# Patient Record
Sex: FEMALE | Race: WHITE | NOT HISPANIC OR LATINO | Employment: FULL TIME | URBAN - METROPOLITAN AREA
[De-identification: names, ages, dates, MRNs, and addresses within clinical notes are randomized per-mention and may not be internally consistent; named-entity substitution may affect disease eponyms.]

---

## 2022-07-09 ENCOUNTER — APPOINTMENT (OUTPATIENT)
Dept: RADIOLOGY | Facility: CLINIC | Age: 52
End: 2022-07-09
Attending: EMERGENCY MEDICINE
Payer: COMMERCIAL

## 2022-07-09 ENCOUNTER — HOSPITAL ENCOUNTER (EMERGENCY)
Facility: CLINIC | Age: 52
Discharge: HOME | End: 2022-07-09
Attending: EMERGENCY MEDICINE | Admitting: EMERGENCY MEDICINE
Payer: COMMERCIAL

## 2022-07-09 VITALS
DIASTOLIC BLOOD PRESSURE: 86 MMHG | WEIGHT: 190 LBS | BODY MASS INDEX: 28.14 KG/M2 | TEMPERATURE: 98.2 F | HEART RATE: 104 BPM | OXYGEN SATURATION: 95 % | HEIGHT: 69 IN | RESPIRATION RATE: 16 BRPM | SYSTOLIC BLOOD PRESSURE: 136 MMHG

## 2022-07-09 DIAGNOSIS — S62.102A CLOSED FRACTURE OF LEFT WRIST, INITIAL ENCOUNTER: ICD-10-CM

## 2022-07-09 PROCEDURE — 73080 X-RAY EXAM OF ELBOW: CPT | Mod: LT

## 2022-07-09 PROCEDURE — 99284 EMERGENCY DEPT VISIT MOD MDM: CPT | Mod: 25

## 2022-07-09 PROCEDURE — 25600 CLTX DST RDL FX/EPHYS SEP WO: CPT | Mod: LT

## 2022-07-09 PROCEDURE — 73110 X-RAY EXAM OF WRIST: CPT | Mod: LT

## 2022-07-09 RX ORDER — OXYCODONE HYDROCHLORIDE 5 MG/1
5 TABLET ORAL EVERY 6 HOURS PRN
Qty: 10 TABLET | Refills: 0 | Status: SHIPPED | OUTPATIENT
Start: 2022-07-09 | End: 2022-07-12

## 2022-07-09 RX ORDER — OXYCODONE HYDROCHLORIDE 5 MG/1
5 TABLET ORAL ONCE
Status: DISCONTINUED | OUTPATIENT
Start: 2022-07-09 | End: 2022-07-09 | Stop reason: HOSPADM

## 2022-07-09 ASSESSMENT — ENCOUNTER SYMPTOMS
WEAKNESS: 0
NUMBNESS: 0
BRUISES/BLEEDS EASILY: 0
SHORTNESS OF BREATH: 0
VOMITING: 0
CONFUSION: 0
BACK PAIN: 0
ABDOMINAL PAIN: 0
JOINT SWELLING: 1
NECK PAIN: 0
WOUND: 1

## 2022-07-09 NOTE — DISCHARGE INSTRUCTIONS
You should take ibuprofen, 600mg, three times per day as needed for pain.  You can also use acetaminophen, 650 mg, up to four times per day as needed for pain.  You can use these medications together, there are noconcerning interactions.  If this does not adequately control your pain, you can use 1-2 tabs of the prescribed oxycodone every 6 hours as needed for uncontrolled pain.  If you use this medication, you should not drive orperform other activities that require full attention as this medication may make you drowsy.  Oxycodone, like all opiate pain medications, is potentially habit forming and you should only use the minimum amount necessary tocontrol your pain.    Wear your splint 24 hours a day unless you are in the shower.  Follow-up orthopedics next week.  Return to the ER for worsening symptoms.

## 2022-07-09 NOTE — ED PROVIDER NOTES
EMERGENCY DEPARTMENT ENCOUNTER      NAME: Noemy Quezada  AGE: 52 year old female  YOB: 1970  MRN: [id number]  EVALUATION DATE & TIME: 7/9/2022  2:49 AM    PCP: Nola Navas    ED PROVIDER: Bunny Perez MD      Chief Complaint   Patient presents with     Trauma         FINAL IMPRESSION:  1. Closed fracture of left wrist, initial encounter          ED COURSE & MEDICAL DECISION MAKING:    Pertinent Labs & Imaging studies reviewed. (See chart for details)  3:03 AM I met with the patient to gather history and to perform my initial exam. We discussed plans for the ED course, including diagnostic testing and treatment.  4:15 AM I rechecked and updated the patient with results and plan for discharge.    52 year old female presents to the Emergency Department for evaluation of left wrist trauma.  Fell 24 hours ago.  Has swelling and tenderness    X-ray shows mildly displaced distal radius fracture.  Compartment syndrome unlikely.  Neuro intact.  Prefabricated splint applied.  Ordered tablet oxycodone which patient is declining.  X-ray elbow negative for fracture.    Plan for discharge home and follow-up with orthopedics next week    At the conclusion of the encounter I discussed the results of all of the tests and the disposition. The questions were answered. The patient or family acknowledged understanding and was agreeable with the care plan.       PPE worn: n95 mask, face shield.    MEDICATIONS GIVEN IN THE EMERGENCY:  Medications   oxyCODONE (ROXICODONE) tablet 5 mg (5 mg Oral Not Given 7/9/22 0314)       NEW PRESCRIPTIONS STARTED AT TODAY'S ER VISIT  New Prescriptions    OXYCODONE (ROXICODONE) 5 MG TABLET    Take 1 tablet (5 mg) by mouth every 6 hours as needed for pain          =================================================================    HPI    Patient information was obtained from: patient     Use of : N/A       Noemy Quezada is a 52 year old female otherwise healthy  who presents to this ED via walk-in for evaluation of left wrist pain and swelling.     Patient presents with left wrist pain and swelling after a trip and fall onto tile floor at home on 7/7/22 (~2 days ago). Patient landed on her left wrist and elbow during the fall. She comes to the ED tonight as she has had constant severe pain to her left wrist since the fall. Has been unable to sleep well secondary to pain. Also notes left elbow pain. No head injury. No other injuries or pain related complaints from the fall. Denies headache, neck or back pain, hip pain, right upper extremity pain, or pain to her lower extremities. She is right handed. States she is otherwise healthy. Did have a few alcoholic drinks tonight though nothing within the past few hours prior to ED arrival.       REVIEW OF SYSTEMS   Review of Systems   HENT:        Negative for head injury.   Respiratory: Negative for shortness of breath.    Cardiovascular: Negative for chest pain.   Gastrointestinal: Negative for abdominal pain and vomiting.   Musculoskeletal: Positive for joint swelling. Negative for back pain and neck pain.        Positive for left wrist pain and left elbow pain.  Negative for hip pain, right upper extremity pain, or lower extremity pain.   Skin: Positive for wound.   Neurological: Negative for weakness and numbness.   Hematological: Does not bruise/bleed easily.   Psychiatric/Behavioral: Negative for confusion.        PAST MEDICAL HISTORY:  History reviewed. No pertinent past medical history.    PAST SURGICAL HISTORY:  History reviewed. No pertinent surgical history.        CURRENT MEDICATIONS:    oxyCODONE (ROXICODONE) 5 MG tablet        ALLERGIES:  No Known Allergies    FAMILY HISTORY:  History reviewed. No pertinent family history.    SOCIAL HISTORY:   Social History     Socioeconomic History     Marital status:      Spouse name: None     Number of children: None     Years of education: None     Highest education  "level: None   Substance and Sexual Activity     Alcohol use: Yes         VITALS:  /86   Pulse 104   Temp 98.2  F (36.8  C) (Temporal)   Resp 16   Ht 1.753 m (5' 9\")   Wt 86.2 kg (190 lb)   SpO2 95%   BMI 28.06 kg/m      PHYSICAL EXAM    Vitals: /86   Pulse 104   Temp 98.2  F (36.8  C) (Temporal)   Resp 16   Ht 1.753 m (5' 9\")   Wt 86.2 kg (190 lb)   SpO2 95%   BMI 28.06 kg/m    General: Appears in no acute distress, awake, alert, interactive.  Eyes: Conjunctivae non-injected. Sclera anicteric.  HENT: Atraumatic.  Neck: Supple.  Respiratory/Chest: Respiration unlabored.  Abdomen: non distended  Musculoskeletal: tenderness and swelling to left distal radius, tenderness to left elbow. No snuff box tenderness. No edema or erythema.  Skin: Normal color. No rash or diaphoresis.  Neurologic: Face symmetric, moves all extremities spontaneously. Speech clear.  Psychiatric: Oriented to person, place, and time. Affect appropriate.     LAB:  All pertinent labs reviewed and interpreted.  Results for orders placed or performed during the hospital encounter of 07/09/22   XR Wrist Left G/E 3 Views    Impression    IMPRESSION: Minimally displaced fracture of the dorsal cortex of the distal radius, best appreciated on the lateral view. Overlying soft tissue swelling.   Elbow  XR, G/E 3 views, left    Impression    IMPRESSION: Normal joint spaces and alignment. No fracture or joint effusion.       RADIOLOGY:  Reviewed all pertinent imaging. Please see official radiology report.  Elbow  XR, G/E 3 views, left   Final Result   IMPRESSION: Normal joint spaces and alignment. No fracture or joint effusion.      XR Wrist Left G/E 3 Views   Final Result   IMPRESSION: Minimally displaced fracture of the dorsal cortex of the distal radius, best appreciated on the lateral view. Overlying soft tissue swelling.          PROCEDURES:     PROCEDURE: Splint Placement   INDICATIONS: left distal radius fracture   PROCEDURE " PROVIDER: Dr Rubén Perez   NOTE:  A Volar wrist prefabricated splint was applied to the Left upper extremity by the above provider. As noted in the physical exam, distal CMS was intact prior to placement. The splint was checked and the fit was adjusted to ensure proper positioning after placement. Sensation and circulation, as well as motor function, are unchanged after splint placement and the patient is more comfortable with the splint in place.           I, Tulio Caro, am serving as a scribe to document services personally performed by Bunny Perez MD based on my observation and the provider's statements to me. I, Bunny Perez, attest that Tulio Caro is acting in a scribe capacity, has observed my performance of the services and has documented them in accordance with my direction.    Bunny Perez MD  Emergency Medicine  [facility] EMERGENCY ROOM  [address] [place] [id number]  [phone]     Bunny Perez MD  07/09/22 0418

## 2022-07-09 NOTE — ED TRIAGE NOTES
Pt fell on Thursday and injured left wrist, wrist is swollen and painful, just returned to [facility] to be evaluated, CMS intact     Triage Assessment     Row Name 07/09/22 0211       Triage Assessment (Adult)    Airway WDL WDL       Respiratory WDL    Respiratory WDL WDL       Skin Circulation/Temperature WDL    Skin Circulation/Temperature WDL WDL       Cardiac WDL    Cardiac WDL WDL       Peripheral/Neurovascular WDL    Peripheral Neurovascular WDL WDL       Cognitive/Neuro/Behavioral WDL    Cognitive/Neuro/Behavioral WDL WDL